# Patient Record
Sex: FEMALE | Race: BLACK OR AFRICAN AMERICAN | NOT HISPANIC OR LATINO | URBAN - METROPOLITAN AREA
[De-identification: names, ages, dates, MRNs, and addresses within clinical notes are randomized per-mention and may not be internally consistent; named-entity substitution may affect disease eponyms.]

---

## 2020-11-24 ENCOUNTER — OFFICE VISIT (OUTPATIENT)
Dept: URBAN - METROPOLITAN AREA CLINIC 98 | Facility: CLINIC | Age: 25
End: 2020-11-24

## 2021-06-14 ENCOUNTER — OFFICE VISIT (OUTPATIENT)
Dept: URBAN - METROPOLITAN AREA CLINIC 124 | Facility: CLINIC | Age: 26
End: 2021-06-14
Payer: COMMERCIAL

## 2021-06-14 VITALS
RESPIRATION RATE: 74 BRPM | WEIGHT: 143.6 LBS | HEIGHT: 64 IN | TEMPERATURE: 98.1 F | SYSTOLIC BLOOD PRESSURE: 120 MMHG | DIASTOLIC BLOOD PRESSURE: 74 MMHG | BODY MASS INDEX: 24.52 KG/M2

## 2021-06-14 DIAGNOSIS — K62.5 RECTAL BLEEDING: ICD-10-CM

## 2021-06-14 DIAGNOSIS — K64.9 HEMORRHOIDS, UNSPECIFIED HEMORRHOID TYPE: ICD-10-CM

## 2021-06-14 DIAGNOSIS — R10.84 GENERALIZED ABDOMINAL PAIN: ICD-10-CM

## 2021-06-14 PROCEDURE — 99204 OFFICE O/P NEW MOD 45 MIN: CPT | Performed by: INTERNAL MEDICINE

## 2021-06-14 NOTE — HPI-TODAY'S VISIT:
26 yo female referred by Dr Mackenzie Jorge for a GI consultation of rectal bleeding and a copy of this note will be sent to the referring provider. Pt had colonoscopies by Dr Jorge in 11/2020 and prior in 2016 or 2017. 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 Pt had suppositories and they did not work that well. Pt started birth control in 7/2020. Pt says she has bleeding with dairy and bread. Pt had more bleeding so had repeat colonoscopy in 2020- Internal and External hemorrhoids, and she thinks some inflammation but she was having UC. Pt also having more lower abdominal pain. Pt was going 2-3 x a day as baseline. Pt using metamucil at night so now having softer stool in am. Pt says evacuating helps the pain. 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 Pt is from [place]. Pt denies GERD.

## 2021-08-16 ENCOUNTER — OFFICE VISIT (OUTPATIENT)
Dept: URBAN - METROPOLITAN AREA TELEHEALTH 2 | Facility: TELEHEALTH | Age: 26
End: 2021-08-16

## 2021-08-20 ENCOUNTER — TELEPHONE ENCOUNTER (OUTPATIENT)
Dept: URBAN - METROPOLITAN AREA CLINIC 92 | Facility: CLINIC | Age: 26
End: 2021-08-20

## 2021-08-20 ENCOUNTER — OFFICE VISIT (OUTPATIENT)
Dept: URBAN - METROPOLITAN AREA TELEHEALTH 2 | Facility: TELEHEALTH | Age: 26
End: 2021-08-20
Payer: COMMERCIAL

## 2021-08-20 DIAGNOSIS — K62.5 RECTAL BLEEDING: ICD-10-CM

## 2021-08-20 DIAGNOSIS — K64.8 BLEEDING INTERNAL HEMORRHOIDS: ICD-10-CM

## 2021-08-20 DIAGNOSIS — K64.9 HEMORRHOIDS, UNSPECIFIED HEMORRHOID TYPE: ICD-10-CM

## 2021-08-20 DIAGNOSIS — R10.84 GENERALIZED ABDOMINAL PAIN: ICD-10-CM

## 2021-08-20 PROCEDURE — 99214 OFFICE O/P EST MOD 30 MIN: CPT | Performed by: INTERNAL MEDICINE

## 2021-08-20 RX ORDER — HYDROCORTISONE ACETATE 25 MG/1
1 SUPPOSITORY SUPPOSITORY RECTAL NIGHTLY
Qty: 14 | Refills: 1 | OUTPATIENT
Start: 2021-08-20 | End: 2021-09-17

## 2021-08-20 RX ORDER — HYDROCORTISONE ACETATE AND PRAMOXINE HYDROCHLORIDE 25; 10 MG/G; MG/G
1 APPLICATION CREAM TOPICAL THREE TIMES A DAY
Qty: 30 GRAM | Refills: 1 | OUTPATIENT
Start: 2021-08-20 | End: 2021-09-17

## 2021-08-20 NOTE — HPI-TODAY'S VISIT:
26yoF seen in June for rectal bleeding. Pt had colonoscopies by Dr Mackenzie Jorge in 11/2020 that showed mild pancolitis, normal TI, Mild hyperplastic crypts in rectum but no chronic or active colitis or ileitis. Prior colonoscopies per patient also only showed hemorrhoids. She tried supp years ago but nothing recently and had an Anoscopy 5/2021 with Dr. Jorge showing mild IH w some recent irritation   She did food allergy test, CBC, celiac testing and TSH with Dr. Mcdaniels since her last OV and no found allergies and normal labs.  She notes persistent inermittent rectal bleeding sometimes without BMs, often just with urination. She was on vacation recently and had blood every day but less since coming home 1 week ago, small amounts and sporadic. Bleeds more with dairy intake. The blood is BRB. On fiber she has soft BM 2-3 times/day. She has occasional abd discomfort that is pressure-like and some increased with breads, improved off gluten. No N/V, fevers. No FH IBD

## 2021-08-21 ENCOUNTER — WEB ENCOUNTER (OUTPATIENT)
Dept: URBAN - METROPOLITAN AREA CLINIC 92 | Facility: CLINIC | Age: 26
End: 2021-08-21

## 2021-09-09 ENCOUNTER — WEB ENCOUNTER (OUTPATIENT)
Dept: URBAN - METROPOLITAN AREA CLINIC 92 | Facility: CLINIC | Age: 26
End: 2021-09-09

## 2021-09-20 ENCOUNTER — WEB ENCOUNTER (OUTPATIENT)
Dept: URBAN - METROPOLITAN AREA CLINIC 92 | Facility: CLINIC | Age: 26
End: 2021-09-20

## 2021-10-01 ENCOUNTER — LAB OUTSIDE AN ENCOUNTER (OUTPATIENT)
Dept: URBAN - METROPOLITAN AREA CLINIC 92 | Facility: CLINIC | Age: 26
End: 2021-10-01

## 2021-10-02 LAB — C-REACTIVE PROTEIN, QUANT: <1

## 2021-10-04 ENCOUNTER — LAB OUTSIDE AN ENCOUNTER (OUTPATIENT)
Dept: URBAN - METROPOLITAN AREA CLINIC 92 | Facility: CLINIC | Age: 26
End: 2021-10-04

## 2021-10-06 LAB — CALPROTECTIN, FECAL: 17

## 2021-10-13 ENCOUNTER — DASHBOARD ENCOUNTERS (OUTPATIENT)
Age: 26
End: 2021-10-13

## 2021-10-15 ENCOUNTER — OFFICE VISIT (OUTPATIENT)
Dept: URBAN - METROPOLITAN AREA TELEHEALTH 2 | Facility: TELEHEALTH | Age: 26
End: 2021-10-15
Payer: COMMERCIAL

## 2021-10-15 VITALS — BODY MASS INDEX: 24.41 KG/M2 | WEIGHT: 143 LBS | HEIGHT: 64 IN

## 2021-10-15 DIAGNOSIS — K64.8 OTHER HEMORRHOIDS: ICD-10-CM

## 2021-10-15 DIAGNOSIS — K62.5 RECTAL BLEEDING: ICD-10-CM

## 2021-10-15 DIAGNOSIS — R10.84 GENERALIZED ABDOMINAL PAIN: ICD-10-CM

## 2021-10-15 PROCEDURE — 99214 OFFICE O/P EST MOD 30 MIN: CPT | Performed by: INTERNAL MEDICINE

## 2021-10-15 NOTE — HPI-TODAY'S VISIT:
26yoF seen in June for rectal bleeding. Pt had colonoscopies by Dr Mackenzie Jorge in 11/2020 that showed mild pancolitis, normal TI, Mild hyperplastic crypts in rectum but no chronic or active colitis or ileitis. Prior colonoscopies per patient also only showed hemorrhoids. She tried supp years ago and had an Anoscopy 5/2021 with Dr. Jorge showing mild IH w some recent irritation  She did food allergy test, CBC, celiac testing and TSH with Dr. Mcdaniels  no found allergies and normal labs.  She noted persistent inermittent rectal bleeding sometimes without BMs, often just with urination at her last OV.  Advised to do sitz baths, supp and hemm cream and obtained below FC/CRP and notes bleeding has sign improved and is now rare, only with BMs and only on the TP but never in toilet bowl. She has BM 1-2/day, formed but continues to note in the AM some abd aches that are in the lower intestine and improve with BMs.